# Patient Record
Sex: FEMALE | Race: AMERICAN INDIAN OR ALASKA NATIVE
[De-identification: names, ages, dates, MRNs, and addresses within clinical notes are randomized per-mention and may not be internally consistent; named-entity substitution may affect disease eponyms.]

---

## 2019-01-30 ENCOUNTER — HOSPITAL ENCOUNTER (EMERGENCY)
Dept: HOSPITAL 5 - ED | Age: 34
Discharge: HOME | End: 2019-01-30
Payer: COMMERCIAL

## 2019-01-30 PROCEDURE — 99282 EMERGENCY DEPT VISIT SF MDM: CPT

## 2019-01-30 NOTE — EMERGENCY DEPARTMENT REPORT
ED Upper Extremity Inj HPI





- General


Chief Complaint: Extremity Injury, Upper


Stated Complaint: LFT HAND AND WRIST PAIN


Time Seen by Provider: 01/30/19 10:23


Source: patient


Mode of arrival: Ambulatory


Limitations: No Limitations





- History of Present Illness


Initial Comments: 





This is a 33-year-old female nontoxic, well nourished in appearance, no acute 

signs of distress presents to the ED with c/o of bilateral wrist pain x months. 

Patient stated that she follows orthopedic doctor and had xrays within normal 

limits.  Patient stated she was diagnosed with inflamation and was prescribed 

steroids and tylneol #3 with minimal to no relief.  Patient stated has a follow-

up appointment with orthopedic doctor this friday.  Patient denies any trauma. 

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  Patient denies any 

numbness, tingling, fever, chills, nausea, vomiting, chest pain, shortness of 

breath, headache, stiff neck.  Patient denies any joint swelling or joint 

redness.  Patient stated has some decreased range of motion due to pain.  

Patient stated allergies to Ibuprofen.


MD Complaint: Injury to:: left, right, wrist


-: month(s)


Other Extremity Injury: Wrist: Left, Right


Other Injuries: none


Severity scale (0 -10): 8


Improves With: immobilization


Worsens With: movement of extremity


Associated Symptoms: denies other symptoms.  denies: weakness, numbness, neck 

pain, suspects foreign body, nausea/vomiting, heard/felt popping sensat





- Related Data


                                  Previous Rx's











 Medication  Instructions  Recorded  Last Taken  Type


 


Acetaminophen/Codeine [Tylenol 1 tab PO Q6H PRN #12 tab 12/26/18 Unknown Rx





/Codeine # 3 tab]    


 


HYDROcodone/ACETAMINOPHEN [Norco 1 each PO Q6H PRN #12 tablet 01/30/19 Unknown 

Rx





5-325 Tablet]    











                                    Allergies











Allergy/AdvReac Type Severity Reaction Status Date / Time


 


ibuprofen AdvReac  Unknown Verified 01/30/19 09:57














ED Review of Systems


ROS: 


Stated complaint: LFT HAND AND WRIST PAIN


Other details as noted in HPI





Constitutional: denies: chills, fever


Eyes: denies: eye pain, eye discharge, vision change


ENT: denies: ear pain, throat pain


Respiratory: denies: cough, shortness of breath, wheezing


Cardiovascular: denies: chest pain, palpitations


Endocrine: no symptoms reported


Gastrointestinal: denies: abdominal pain, nausea, diarrhea


Genitourinary: denies: urgency, dysuria, discharge


Musculoskeletal: arthralgia.  denies: back pain, joint swelling


Skin: denies: rash, lesions


Neurological: denies: headache, weakness, paresthesias


Psychiatric: denies: anxiety, depression


Hematological/Lymphatic: denies: easy bleeding, easy bruising





ED Past Medical Hx





- Past Medical History


Previous Medical History?: No





- Surgical History


Past Surgical History?: No





- Social History


Smoking Status: Never Smoker


Substance Use Type: None





- Medications


Home Medications: 


                                Home Medications











 Medication  Instructions  Recorded  Confirmed  Last Taken  Type


 


Acetaminophen/Codeine [Tylenol 1 tab PO Q6H PRN #12 tab 12/26/18  Unknown Rx





/Codeine # 3 tab]     


 


HYDROcodone/ACETAMINOPHEN [Norco 1 each PO Q6H PRN #12 tablet 01/30/19  Unknown 

Rx





5-325 Tablet]     














ED Physical Exam





- General


Limitations: No Limitations


General appearance: alert, in no apparent distress





- Head


Head exam: Present: atraumatic, normocephalic





- Extremities Exam


Extremities exam: Present: normal inspection, full ROM, tenderness, normal 

capillary refill.  Absent: joint swelling





- Expanded Upper Extremity Exam


  ** Left


Forearm Wrist exam: Present: normal inspection, full ROM.  Absent: tenderness, 

swelling, abrasion, laceration, ecchymosis, deformity, crepidus, dislocation, 

erythema, tenderness over anatomical snuff box, pain with axial thumb loading


Hand Wrist exam: Present: normal inspection, full ROM, tenderness.  Absent: sw

elling, abrasion, laceration, ecchymosis, deformity, crepidus, dislocation, 

erythema, amputation, nail avulsion, subungual hematoma


Hand L/R Back: 


                            __________________________














                            __________________________





 1 - pain here





 2 - pain here





Vascular: Present: vascular compromise, normal capillary refill





- Back Exam


Back exam: Present: normal inspection, full ROM





- Neurological Exam


Neurological exam: Present: alert, oriented X3





- Psychiatric


Psychiatric exam: Present: normal affect, normal mood





- Skin


Skin exam: Present: warm, dry, intact, normal color.  Absent: rash





- Other


Other exam information: 





Negative phalens test.





ED Course


                                   Vital Signs











  01/30/19





  09:57


 


Temperature 98.3 F


 


Pulse Rate 60


 


Respiratory 16





Rate 


 


Blood Pressure 131/86


 


O2 Sat by Pulse 99





Oximetry 














- Reevaluation(s)


Reevaluation #1: 





01/30/19 10:37


Patient is speaking in full sentences with no signs of distress noted.





ED Medical Decision Making





- Medical Decision Making





This is a 33-year-old female that presents with bilateral wrist arthralgia.  

Patient is stable and was examined by me.  As per patient x-rays has been 

obtained and she does have a orthopedic follow-up and has been seen by 

orthopedic physician with last visit last week.  Patient does have normal gait 

with no tenderness and no joint swelling.  No ecchymosis. no joint redness or 

swelling. Not warm to touch. No signs of cellulites present. Patient does have a

wrist immobilizer for pain comfort.  Patient was instructed to RICE therapy.  I 

will discharge patient with [place] for pain control.  At time of discharge, the 

patient does not seem toxic or ill in appearance.  No acute signs of distress 

noted.  Patient agrees to discharge treatment plan of care.  No further 

questions noted by the patient.


Critical care attestation.: 


If time is entered above; I have spent that time in minutes in the direct care 

of this critically ill patient, excluding procedure time.








ED Disposition


Clinical Impression: 


Arthralgia


Qualifiers:


 Joint pain location: wrist Laterality: bilateral Qualified Code(s): M25.531 - 

Pain in right wrist; M25.532 - Pain in left wrist





Disposition: DC-01 TO HOME OR SELFCARE


Is pt being admited?: No


Does the pt Need Aspirin: No


Condition: Stable


Additional Instructions: 


Follow-up with a orthopedic doctor in 3-5 days or if symptoms worsen and 

continue return to emergency room as soon as possible. 


Do not operate any machinery while taking Norco as this may cause drowsiness.


Prescriptions: 


HYDROcodone/ACETAMINOPHEN [Norco 5-325 Tablet] 1 each PO Q6H PRN #12 tablet


 PRN Reason: Pain , Severe (7-10)


Referrals: 


DOROTHY COSTA MD [Primary Care Provider] - 3-5 Days


PRIMARY CARE,MD [Referring] - 3-5 Days


ISIDRO MIN MD [Staff Physician] - 3-5 Days


Forms:  Work/School Release Form(ED)

## 2019-09-11 ENCOUNTER — HOSPITAL ENCOUNTER (EMERGENCY)
Dept: HOSPITAL 5 - ED | Age: 34
Discharge: HOME | End: 2019-09-11
Payer: SELF-PAY

## 2019-09-11 VITALS — SYSTOLIC BLOOD PRESSURE: 136 MMHG | DIASTOLIC BLOOD PRESSURE: 77 MMHG

## 2019-09-11 DIAGNOSIS — Z88.6: ICD-10-CM

## 2019-09-11 DIAGNOSIS — M17.11: Primary | ICD-10-CM

## 2019-09-11 DIAGNOSIS — Z79.899: ICD-10-CM

## 2019-09-11 DIAGNOSIS — D16.21: ICD-10-CM

## 2019-09-11 PROCEDURE — 73562 X-RAY EXAM OF KNEE 3: CPT

## 2019-09-11 PROCEDURE — 29505 APPLICATION LONG LEG SPLINT: CPT

## 2019-09-11 PROCEDURE — 99283 EMERGENCY DEPT VISIT LOW MDM: CPT

## 2019-09-11 NOTE — EMERGENCY DEPARTMENT REPORT
Blank Doc





- Documentation


Documentation: 





34-year-old female that presents with right knee pain.





This initial assessment/diagnostic orders/clinical plan/treatment(s) is/are 

subject to change based on patient's health status, clinical progression and re-

assessment by fellow clinical providers in the ED.  Further treatment and workup

at subsequent clinical providers discretion.  Patient/guardians urged not to 

elope from the ED as their condition may be serious if not clinically assessed 

and managed.  Initial orders include:


1- Patient sent to [facility] for further evaluation and treatment


2- xrays

## 2019-09-11 NOTE — EMERGENCY DEPARTMENT REPORT
ED Extremity Problem HPI





- General


Chief complaint: Extremity Injury, Lower


Stated complaint: PAIN IN (R) KNEE


Time Seen by Provider: 19 17:50


Source: patient


Mode of arrival: Ambulatory


Limitations: No Limitations





- History of Present Illness


Initial comments: 





Patient is a 34-year-old -American female with a history of chronic right

knee pain who presents to the ED with complaint of acute exacerbation of her 

chronic right knee pain for the last 1 week.  Patient denies fall, traumatic 

injury, dizziness, fever, chills, cough, nausea and vomiting, chest pain or 

shortness of breath.  Patient states that she used to be physically active in 

basketball many years ago and injured the right knee while playing basketball.  

Patient states that the current pain is worse with inability range of motion 

when she wakes up in the morning.


MD Complaint: extremity pain (right knee), joint paint (right knee)


-: Gradual, year(s) (many)


Location: right, lower extremity (knee), knee


History of Same: Yes (chronic)


-: Yes arthralgia, No fever, No associated dyspnea, No associated chest pain


Severity scale (0 -10): 8


Quality: aching, sharp


Consistency: constant


Improves with: movement


Worsens with: palpation, rest


Associated Symptoms: denies other symptoms, arthralgias.  denies: chest pain, 

shortness of breath, myalgias, rash





- Related Data


                                  Previous Rx's











 Medication  Instructions  Recorded  Last Taken  Type


 


Acetaminophen/Codeine [Tylenol 1 tab PO Q6H PRN #12 tab 18 Unknown Rx





/Codeine # 3 tab]    


 


HYDROcodone/ACETAMINOPHEN [Norco 1 each PO Q6H PRN #12 tablet 19 Unknown 

Rx





5-325 Tablet]    


 


predniSONE [Deltasone] 40 mg PO QDAY #12 tab 19 Unknown Rx


 


traMADol [Ultram] 50 mg PO Q6HR PRN #15 tablet 19 Unknown Rx











                                    Allergies











Allergy/AdvReac Type Severity Reaction Status Date / Time


 


ibuprofen AdvReac  Unknown Verified 19 09:57














ED Review of Systems


ROS: 


Stated complaint: PAIN IN (R) KNEE


Other details as noted in HPI





Constitutional: denies: chills, fever


Eyes: denies: eye pain, eye discharge, vision change


ENT: denies: ear pain, throat pain


Respiratory: denies: cough, shortness of breath, wheezing


Cardiovascular: denies: chest pain, palpitations


Endocrine: no symptoms reported


Gastrointestinal: denies: abdominal pain, nausea, diarrhea


Genitourinary: denies: urgency, dysuria, discharge


Musculoskeletal: arthralgia (right knee), myalgia.  denies: back pain, joint 

swelling


Skin: denies: rash, lesions


Neurological: denies: headache, weakness, paresthesias


Psychiatric: denies: anxiety, depression


Hematological/Lymphatic: denies: easy bleeding, easy bruising





ED Past Medical Hx





- Past Medical History


Previous Medical History?: No





- Surgical History


Past Surgical History?: No





- Social History


Smoking Status: Never Smoker


Substance Use Type: Alcohol





- Medications


Home Medications: 


                                Home Medications











 Medication  Instructions  Recorded  Confirmed  Last Taken  Type


 


Acetaminophen/Codeine [Tylenol 1 tab PO Q6H PRN #12 tab 18  Unknown Rx





/Codeine # 3 tab]     


 


HYDROcodone/ACETAMINOPHEN [Norco 1 each PO Q6H PRN #12 tablet 19  Unknown 

Rx





5-325 Tablet]     


 


predniSONE [Deltasone] 40 mg PO QDAY #12 tab 19  Unknown Rx


 


traMADol [Ultram] 50 mg PO Q6HR PRN #15 tablet 19  Unknown Rx














ED Physical Exam





- General


Limitations: No Limitations


General appearance: alert, in no apparent distress





- Head


Head exam: Present: atraumatic, normocephalic, normal inspection





- Eye


Eye exam: Present: normal appearance, PERRL, EOMI





- ENT


ENT exam: Present: normal exam, normal orophraynx, mucous membranes moist, TM's 

normal bilaterally, normal external ear exam





- Neck


Neck exam: Present: normal inspection, full ROM





- Respiratory


Respiratory exam: Present: normal lung sounds bilaterally.  Absent: respiratory 

distress, wheezes, rales, chest wall tenderness, accessory muscle use, decreased

 breath sounds





- Cardiovascular


Cardiovascular Exam: Present: regular rate, normal rhythm, normal heart sounds. 

 Absent: systolic murmur, diastolic murmur, rubs, gallop





- GI/Abdominal


GI/Abdominal exam: Present: soft, normal bowel sounds.  Absent: tenderness, 

guarding, rebound, hyperactive bowel sounds, hypoactive bowel sounds





- Extremities Exam


Extremities exam: Present: normal inspection, tenderness (Palpable right knee 

tenderness with limited ROM due to pain), normal capillary refill.  Absent: 

pedal edema, joint swelling, calf tenderness





- Back Exam


Back exam: Present: normal inspection, full ROM.  Absent: tenderness, muscle 

spasm, paraspinal tenderness





- Neurological Exam


Neurological exam: Present: alert, oriented X3, CN II-XII intact, normal gait, 

reflexes normal





- Psychiatric


Psychiatric exam: Present: normal affect, normal mood





- Skin


Skin exam: Present: warm, dry, intact, normal color.  Absent: rash





ED Course


                                   Vital Signs











  19





  17:50


 


Temperature 98.2 F


 


Pulse Rate 62


 


Respiratory 13





Rate 


 


Blood Pressure 153/86





[Left] 


 


O2 Sat by Pulse 98





Oximetry 














- Reevaluation(s)


Reevaluation #1: 





19 21:52


This is a 34-year-old female who presented to the ED with acute exacerbation of 

her chronic right knee pain for the last 1 week.  In the ED, patient is alert 

and oriented 3 and appears to be in pain but is not in distress.  Right knee x-

ray shows a sessile type osteochondroma identified with involving the posterior 

aspect of the distal femoral metaphysis. I cannot exclude possible mass effect 

on the underlying neurovascular bundle. No fracture or subluxation.  Patient was

 tested for pain in the ED and the right knee was splinted and Ace wrap.  

Patient was discharged home on pain medications and advised to follow-up with 

her primary care physician in 5-7 days for reevaluation.  Patient was also given

 a referral to the orthopedic surgeon Dr. Luna for further evaluation.  

Patient is advised to return to the ED immediately if symptoms get worse.





ED Medical Decision Making





- Radiology Data


Radiology results: report reviewed, image reviewed





Findings


[address] [address]





XRay Report


Signed





Patient: KEY ABBOTT MR#


: [id number]


: 1985 Acct:[id number]





Age/Sex: 34 / F ADM Date: 19





Loc: ED


Attending Dr:








Ordering Physician: LIZZY NEVES NP


Date of Service: 19


Procedure(s): XR knee 3V RT


Accession Number(s): [id number]





cc: LIZZY NEVES NP





Fluoro Time In Minutes:





Right knee 3 views





INDICATION: Right knee pain.





IMPRESSION: Sessile type osteochondroma is identified with involving the poste

rior aspect of the


distal femoral metaphysis. I cannot exclude possible mass effect on the 

underlying neurovascular


bundle. No fracture or subluxation.





Signer Name: Dedrick Paez MD


Signed: 2019 6:22 PM


Workstation Name: [id number]








Transcribed By: BC


Dictated By: Dedrick Paez MD


Electronically Authenticated By: Dedrick Paez MD


Signed Date/Time: 19 1822





- Medical Decision Making





This is a 34-year-old female who presented to the ED with acute exacerbation of 

her chronic right knee pain for the last 1 week.  In the ED, patient is alert 

and oriented 3 and appears to be in pain but is not in distress.  Right knee x-

ray shows a sessile type osteochondroma identified with involving the posterior 

aspect of the distal femoral metaphysis. I cannot exclude possible mass effect 

on the underlying neurovascular bundle. No fracture or subluxation.  Patient was

 tested for pain in the ED and the right knee was splinted and Ace wrap.  

Patient was discharged home on pain medications and advised to follow-up with 

her primary care physician in 5-7 days for reevaluation.  Patient was also given

 a referral to the orthopedic surgeon Dr. Luna for further evaluation.  

Patient is advised to return to the ED immediately if symptoms get worse.





- Differential Diagnosis


chronic right knee pain; osteoarthritis; muscle strain, osteochondroma


Critical care attestation.: 


If time is entered above; I have spent that time in minutes in the direct care 

of this critically ill patient, excluding procedure time.








ED Disposition


Clinical Impression: 


 Chronic osteoarthritis, Chronic pain of right knee, Osteochondroma of right 

femur





Disposition: DC-01 TO HOME OR SELFCARE


Is pt being admited?: No


Does the pt Need Aspirin: No


Condition: Stable


Instructions:  Osteoarthritis (ED), Knee Pain (ED), Arthralgia (ED)


Additional Instructions: 


Take medications with food, drink plenty of fluids and follow-up with the 

primary care physician in 5-7 days for reevaluation.  Follow-up also with the 

orthopedic surgeon Dr. Luna for further evaluation.  Return to the ED 

immediately if symptoms get worse.


Prescriptions: 


predniSONE [Deltasone] 40 mg PO QDAY #12 tab


traMADol [Ultram] 50 mg PO Q6HR PRN #15 tablet


 PRN Reason: Pain


Referrals: 


PRIMARY MD BRODY [Primary Care Provider] - 3-5 Days


ISIDRO LUNA MD [Staff Physician] - 3-5 Days


Time of Disposition: 21:41


Print Language: ENGLISH

## 2019-09-11 NOTE — XRAY REPORT
Right knee 3 views



INDICATION: Right knee pain.



IMPRESSION: Sessile type osteochondroma is identified with involving the posterior aspect of the dist
al femoral metaphysis. I cannot exclude possible mass effect on the underlying neurovascular bundle. 
No fracture or subluxation.



Signer Name: Dedrick Paez MD 

Signed: 9/11/2019 6:22 PM

 Workstation Name: [id number]

## 2020-09-26 ENCOUNTER — HOSPITAL ENCOUNTER (EMERGENCY)
Dept: HOSPITAL 5 - ED | Age: 35
LOS: 1 days | Discharge: HOME | End: 2020-09-27
Payer: SELF-PAY

## 2020-09-26 DIAGNOSIS — Z79.899: ICD-10-CM

## 2020-09-26 DIAGNOSIS — K52.89: Primary | ICD-10-CM

## 2020-09-26 DIAGNOSIS — Z88.6: ICD-10-CM

## 2020-09-26 DIAGNOSIS — N39.0: ICD-10-CM

## 2020-09-26 LAB
ALBUMIN SERPL-MCNC: 4.6 G/DL (ref 3.9–5)
ALT SERPL-CCNC: 16 UNITS/L (ref 7–56)
BAND NEUTROPHILS # (MANUAL): 0 K/MM3
BILIRUB UR QL STRIP: (no result)
BLOOD UR QL VISUAL: (no result)
BUN SERPL-MCNC: 9 MG/DL (ref 7–17)
BUN/CREAT SERPL: 15 %
CALCIUM SERPL-MCNC: 9.6 MG/DL (ref 8.4–10.2)
HCT VFR BLD CALC: 39.6 % (ref 30.3–42.9)
HEMOLYSIS INDEX: 31
HGB BLD-MCNC: 13.2 GM/DL (ref 10.1–14.3)
MCHC RBC AUTO-ENTMCNC: 33 % (ref 30–34)
MCV RBC AUTO: 101 FL (ref 79–97)
MUCOUS THREADS #/AREA URNS HPF: (no result) /HPF
MYELOCYTES # (MANUAL): 0 K/MM3
PH UR STRIP: 5 [PH] (ref 5–7)
PLATELET # BLD: 317 K/MM3 (ref 140–440)
PROMYELOCYTES # (MANUAL): 0 K/MM3
RBC # BLD AUTO: 3.91 M/MM3 (ref 3.65–5.03)
RBC #/AREA URNS HPF: 174 /HPF (ref 0–6)
TOTAL CELLS COUNTED BLD: 100
UROBILINOGEN UR-MCNC: < 2 MG/DL (ref ?–2)
WBC #/AREA URNS HPF: 66 /HPF (ref 0–6)

## 2020-09-26 PROCEDURE — 83690 ASSAY OF LIPASE: CPT

## 2020-09-26 PROCEDURE — 96365 THER/PROPH/DIAG IV INF INIT: CPT

## 2020-09-26 PROCEDURE — 87086 URINE CULTURE/COLONY COUNT: CPT

## 2020-09-26 PROCEDURE — 99284 EMERGENCY DEPT VISIT MOD MDM: CPT

## 2020-09-26 PROCEDURE — 74177 CT ABD & PELVIS W/CONTRAST: CPT

## 2020-09-26 PROCEDURE — 84703 CHORIONIC GONADOTROPIN ASSAY: CPT

## 2020-09-26 PROCEDURE — 96375 TX/PRO/DX INJ NEW DRUG ADDON: CPT

## 2020-09-26 PROCEDURE — 82140 ASSAY OF AMMONIA: CPT

## 2020-09-26 PROCEDURE — 96361 HYDRATE IV INFUSION ADD-ON: CPT

## 2020-09-26 PROCEDURE — 36415 COLL VENOUS BLD VENIPUNCTURE: CPT

## 2020-09-26 PROCEDURE — 81001 URINALYSIS AUTO W/SCOPE: CPT

## 2020-09-26 PROCEDURE — 87040 BLOOD CULTURE FOR BACTERIA: CPT

## 2020-09-26 PROCEDURE — 80053 COMPREHEN METABOLIC PANEL: CPT

## 2020-09-26 PROCEDURE — 85025 COMPLETE CBC W/AUTO DIFF WBC: CPT

## 2020-09-26 PROCEDURE — 85007 BL SMEAR W/DIFF WBC COUNT: CPT

## 2020-09-26 NOTE — EMERGENCY DEPARTMENT REPORT
ED Abdominal Pain HPI





- General


Chief Complaint: Abdominal Pain


Stated Complaint: VOMITING/ABDOMINAL PAIN


Time Seen by Provider: 09/26/20 22:40


Source: patient


Limitations: No Limitations





- History of Present Illness


Initial Comments: 





35-year-old -American female patient without significant past medical 

history presents with complaints of sudden onset of nausea/vomiting/diarrhea and

generalized abdominal pain starting around 4 AM today.  She rates her current 

pain as a 10/10 in severity and describes it as aching and stabbing.  She denies

any previous abdominal surgeries.  Patient also denies any hematic 

emesis/coffee-ground emesis, hematochezia/melena, recent antibiotic use, fevers,

chest pain, shortness of breath, dysuria/hematuria/urinary frequency, or vaginal

discharge/dyspareunia.  She reports she has had more than 10 episodes of 

vomiting.  She denies heavy alcohol intake.


-: Sudden


Location: diffuse





- Related Data


                                  Previous Rx's











 Medication  Instructions  Recorded  Last Taken  Type


 


Acetaminophen/Codeine [Tylenol 1 tab PO Q6H PRN #12 tab 12/26/18 Unknown Rx





/Codeine # 3 tab]    


 


HYDROcodone/ACETAMINOPHEN [Norco 1 each PO Q6H PRN #12 tablet 01/30/19 Unknown 

Rx





5-325 Tablet]    


 


predniSONE [Deltasone] 40 mg PO QDAY #12 tab 09/11/19 Unknown Rx


 


traMADoL [Ultram] 50 mg PO Q6HR PRN #15 tablet 09/11/19 Unknown Rx


 


Acetaminophen/Codeine [Tylenol 1 tab PO Q6H PRN #8 tab 09/27/20 Unknown Rx





/Codeine # 3 tab]    


 


Metoclopramide [Reglan] 10 mg PO TID PRN #15 tab 09/27/20 Unknown Rx


 


diphenhydrAMINE [Benadryl CAP] 25 mg PO QHS PRN #15 capsule 09/27/20 Unknown Rx


 


levoFLOXacin [Levaquin TAB] 500 mg PO QDAY 4 Days #4 tablet 09/27/20 Unknown Rx











                                    Allergies











Allergy/AdvReac Type Severity Reaction Status Date / Time


 


ibuprofen AdvReac  Unknown Verified 01/30/19 09:57














ED Review of Systems


ROS: 


Stated complaint: VOMITING/ABDOMINAL PAIN


Other details as noted in HPI





Constitutional: chills, malaise.  denies: diaphoresis, fever, weakness


ENT: denies: throat pain


Respiratory: denies: cough, shortness of breath


Cardiovascular: denies: chest pain


Endocrine: denies: excessive sweating


Gastrointestinal: abdominal pain, nausea, vomiting, diarrhea.  denies: 

constipation, hematemesis, melena, hematochezia


Genitourinary: denies: urgency, dysuria, frequency, hematuria


Musculoskeletal: denies: back pain


Skin: denies: rash, lesions





ED Past Medical Hx





- Past Medical History


Previous Medical History?: No





- Surgical History


Past Surgical History?: No





- Social History


Smoking Status: Never Smoker


Substance Use Type: None





- Medications


Home Medications: 


                                Home Medications











 Medication  Instructions  Recorded  Confirmed  Last Taken  Type


 


Acetaminophen/Codeine [Tylenol 1 tab PO Q6H PRN #12 tab 12/26/18  Unknown Rx





/Codeine # 3 tab]     


 


HYDROcodone/ACETAMINOPHEN [Norco 1 each PO Q6H PRN #12 tablet 01/30/19  Unknown 

Rx





5-325 Tablet]     


 


predniSONE [Deltasone] 40 mg PO QDAY #12 tab 09/11/19  Unknown Rx


 


traMADoL [Ultram] 50 mg PO Q6HR PRN #15 tablet 09/11/19  Unknown Rx


 


Acetaminophen/Codeine [Tylenol 1 tab PO Q6H PRN #8 tab 09/27/20  Unknown Rx





/Codeine # 3 tab]     


 


Metoclopramide [Reglan] 10 mg PO TID PRN #15 tab 09/27/20  Unknown Rx


 


diphenhydrAMINE [Benadryl CAP] 25 mg PO QHS PRN #15 capsule 09/27/20  Unknown Rx


 


levoFLOXacin [Levaquin TAB] 500 mg PO QDAY 4 Days #4 tablet 09/27/20  Unknown Rx














ED Physical Exam





- General


Limitations: No Limitations


General appearance: alert, in no apparent distress





- Head


Head exam: Present: atraumatic, normocephalic





- Eye


Eye exam: Present: normal appearance.  Absent: scleral icterus





- ENT


ENT exam: Present: mucous membranes moist





- Neck


Neck exam: Present: normal inspection





- Respiratory


Respiratory exam: Present: normal lung sounds bilaterally.  Absent: respiratory 

distress





- Cardiovascular


Cardiovascular Exam: Present: regular rate, normal rhythm





- GI/Abdominal


GI/Abdominal exam: Present: soft, tenderness (Generalized, worse in 

periumbilical and epigastric region), rebound, normal bowel sounds.  Absent: 

distended, rigid





- Extremities Exam


Extremities exam: Present: normal inspection





- Back Exam


Back exam: Present: normal inspection, full ROM.  Absent: CVA tenderness (R), 

CVA tenderness (L)





- Neurological Exam


Neurological exam: Present: alert, oriented X3, normal gait





- Psychiatric


Psychiatric exam: Present: normal affect, normal mood





- Skin


Skin exam: Present: warm, dry, intact, normal color.  Absent: rash, cyanosis, 

diaphoretic, ecchymosis





ED Course


                                   Vital Signs











  09/26/20 09/27/20





  21:12 01:29


 


Temperature 98.5 F 98.2 F


 


Pulse Rate 77 72


 


Respiratory 14 18





Rate  


 


Blood Pressure 119/90 


 


Blood Pressure  117/71





[Left]  


 


O2 Sat by Pulse 98 98





Oximetry  














ED Medical Decision Making





- Lab Data


Result diagrams: 


                                 09/26/20 21:23





                                 09/26/20 21:23








                                   Lab Results











  09/26/20 09/26/20 09/26/20 Range/Units





  21:13 21:23 21:23 


 


WBC   20.1 H   (4.5-11.0)  K/mm3


 


RBC   3.91   (3.65-5.03)  M/mm3


 


Hgb   13.2   (10.1-14.3)  gm/dl


 


Hct   39.6   (30.3-42.9)  %


 


MCV   101 H   (79-97)  fl


 


MCH   34 H   (28-32)  pg


 


MCHC   33   (30-34)  %


 


RDW   13.0 L   (13.2-15.2)  %


 


Plt Count   317   (140-440)  K/mm3


 


Add Manual Diff   Complete   


 


Total Counted   100   


 


Seg Neutrophils %   Np   


 


Seg Neuts % (Manual)   94.0 H   (40.0-70.0)  %


 


Band Neutrophils %   0   %


 


Lymphocytes % (Manual)   4.0 L   (13.4-35.0)  %


 


Reactive Lymphs % (Man)   0   %


 


Monocytes % (Manual)   2.0   (0.0-7.3)  %


 


Eosinophils % (Manual)   0   (0.0-4.3)  %


 


Basophils % (Manual)   0   (0.0-1.8)  %


 


Metamyelocytes %   0   %


 


Myelocytes %   0   %


 


Promyelocytes %   0   %


 


Blast Cells %   0   %


 


Nucleated RBC %   Not Reportable   


 


Seg Neutrophils # Man   18.9 H   (1.8-7.7)  K/mm3


 


Band Neutrophils #   0.0   K/mm3


 


Lymphocytes # (Manual)   0.8 L   (1.2-5.4)  K/mm3


 


Abs React Lymphs (Man)   0.0   K/mm3


 


Monocytes # (Manual)   0.4   (0.0-0.8)  K/mm3


 


Eosinophils # (Manual)   0.0   (0.0-0.4)  K/mm3


 


Basophils # (Manual)   0.0   (0.0-0.1)  K/mm3


 


Metamyelocytes #   0.0   K/mm3


 


Myelocytes #   0.0   K/mm3


 


Promyelocytes #   0.0   K/mm3


 


Blast Cells #   0.0   K/mm3


 


WBC Morphology   Not Reportable   


 


Hypersegmented Neuts   Not Reportable   


 


Hyposegmented Neuts   Not Reportable   


 


Hypogranular Neuts   Not Reportable   


 


Smudge Cells   Not Reportable   


 


Toxic Granulation   Not Reportable   


 


Toxic Vacuolation   Not Reportable   


 


Dohle Bodies   Not Reportable   


 


Pelger-Huet Anomaly   Not Reportable   


 


Celio Rods   Not Reportable   


 


Platelet Estimate   Consistent w auto   


 


Clumped Platelets   Not Reportable   


 


Plt Clumps, EDTA   Not Reportable   


 


Large Platelets   Not Reportable   


 


Giant Platelets   Not Reportable   


 


Platelet Satelliting   Not Reportable   


 


Plt Morphology Comment   Not Reportable   


 


RBC Morphology   Normal   


 


Dimorphic RBCs   Not Reportable   


 


Polychromasia   Not Reportable   


 


Hypochromasia   Not Reportable   


 


Poikilocytosis   Not Reportable   


 


Anisocytosis   Not Reportable   


 


Microcytosis   Not Reportable   


 


Macrocytosis   Not Reportable   


 


Spherocytes   Not Reportable   


 


Pappenheimer Bodies   Not Reportable   


 


Sickle Cells   Not Reportable   


 


Target Cells   Not Reportable   


 


Tear Drop Cells   Not Reportable   


 


Ovalocytes   Not Reportable   


 


Helmet Cells   Not Reportable   


 


[place] Bodies   Not Reportable   


 


Cabot Rings   Not Reportable   


 


[place] Cells   Not Reportable   


 


Bite Cells   Not Reportable   


 


Crenated Cell   Not Reportable   


 


Elliptocytes   Not Reportable   


 


Acanthocytes (Spur)   Not Reportable   


 


Rouleaux   Not Reportable   


 


Hemoglobin C Crystals   Not Reportable   


 


Schistocytes   Not Reportable   


 


Malaria parasites   Not Reportable   


 


Sea Bodies   Not Reportable   


 


Hem Pathologist Commnt   No   


 


Sodium    135 L  (137-145)  mmol/L


 


Potassium    4.0  (3.6-5.0)  mmol/L


 


Chloride    97.2 L  ()  mmol/L


 


Carbon Dioxide    21 L  (22-30)  mmol/L


 


Anion Gap    21  mmol/L


 


BUN    9  (7-17)  mg/dL


 


Creatinine    0.6  (0.6-1.2)  mg/dL


 


Estimated GFR    > 60  ml/min


 


BUN/Creatinine Ratio    15  %


 


Glucose    116 H  ()  mg/dL


 


Lactic Acid     (0.7-2.0)  mmol/L


 


Calcium    9.6  (8.4-10.2)  mg/dL


 


Total Bilirubin    0.90  (0.1-1.2)  mg/dL


 


AST    18  (5-40)  units/L


 


ALT    16  (7-56)  units/L


 


Alkaline Phosphatase    76  ()  units/L


 


Total Protein    7.9  (6.3-8.2)  g/dL


 


Albumin    4.6  (3.9-5)  g/dL


 


Albumin/Globulin Ratio    1.4  %


 


Lipase    13  (13-60)  units/L


 


HCG, Qual     (Negative)  


 


Urine Color  Yellow    (Yellow)  


 


Urine Turbidity  Slightly-cloudy    (Clear)  


 


Urine pH  5.0    (5.0-7.0)  


 


Ur Specific Gravity  1.024    (1.003-1.030)  


 


Urine Protein  30 mg/dl    (Negative)  mg/dL


 


Urine Glucose (UA)  Neg    (Negative)  mg/dL


 


Urine Ketones  Neg    (Negative)  mg/dL


 


Urine Blood  Lg    (Negative)  


 


Urine Nitrite  Neg    (Negative)  


 


Urine Bilirubin  Neg    (Negative)  


 


Urine Urobilinogen  < 2.0    (<2.0)  mg/dL


 


Ur Leukocyte Esterase  Lg    (Negative)  


 


Urine WBC (Auto)  66.0 H    (0.0-6.0)  /HPF


 


Urine RBC (Auto)  174.0    (0.0-6.0)  /HPF


 


U Epithel Cells (Auto)  7.0    (0-13.0)  /HPF


 


Urine Mucus  3+    /HPF














  09/26/20 09/27/20 Range/Units





  21:23 00:18 


 


WBC    (4.5-11.0)  K/mm3


 


RBC    (3.65-5.03)  M/mm3


 


Hgb    (10.1-14.3)  gm/dl


 


Hct    (30.3-42.9)  %


 


MCV    (79-97)  fl


 


MCH    (28-32)  pg


 


MCHC    (30-34)  %


 


RDW    (13.2-15.2)  %


 


Plt Count    (140-440)  K/mm3


 


Add Manual Diff    


 


Total Counted    


 


Seg Neutrophils %    


 


Seg Neuts % (Manual)    (40.0-70.0)  %


 


Band Neutrophils %    %


 


Lymphocytes % (Manual)    (13.4-35.0)  %


 


Reactive Lymphs % (Man)    %


 


Monocytes % (Manual)    (0.0-7.3)  %


 


Eosinophils % (Manual)    (0.0-4.3)  %


 


Basophils % (Manual)    (0.0-1.8)  %


 


Metamyelocytes %    %


 


Myelocytes %    %


 


Promyelocytes %    %


 


Blast Cells %    %


 


Nucleated RBC %    


 


Seg Neutrophils # Man    (1.8-7.7)  K/mm3


 


Band Neutrophils #    K/mm3


 


Lymphocytes # (Manual)    (1.2-5.4)  K/mm3


 


Abs React Lymphs (Man)    K/mm3


 


Monocytes # (Manual)    (0.0-0.8)  K/mm3


 


Eosinophils # (Manual)    (0.0-0.4)  K/mm3


 


Basophils # (Manual)    (0.0-0.1)  K/mm3


 


Metamyelocytes #    K/mm3


 


Myelocytes #    K/mm3


 


Promyelocytes #    K/mm3


 


Blast Cells #    K/mm3


 


WBC Morphology    


 


Hypersegmented Neuts    


 


Hyposegmented Neuts    


 


Hypogranular Neuts    


 


Smudge Cells    


 


Toxic Granulation    


 


Toxic Vacuolation    


 


Dohle Bodies    


 


Pelger-Huet Anomaly    


 


Celio Rods    


 


Platelet Estimate    


 


Clumped Platelets    


 


Plt Clumps, EDTA    


 


Large Platelets    


 


Giant Platelets    


 


Platelet Satelliting    


 


Plt Morphology Comment    


 


RBC Morphology    


 


Dimorphic RBCs    


 


Polychromasia    


 


Hypochromasia    


 


Poikilocytosis    


 


Anisocytosis    


 


Microcytosis    


 


Macrocytosis    


 


Spherocytes    


 


Pappenheimer Bodies    


 


Sickle Cells    


 


Target Cells    


 


Tear Drop Cells    


 


Ovalocytes    


 


Helmet Cells    


 


[place] Bodies    


 


Cabot Rings    


 


[place] Cells    


 


Bite Cells    


 


Crenated Cell    


 


Elliptocytes    


 


Acanthocytes (Spur)    


 


Rouleaux    


 


Hemoglobin C Crystals    


 


Schistocytes    


 


Malaria parasites    


 


Sea Bodies    


 


Hem Pathologist Commnt    


 


Sodium    (137-145)  mmol/L


 


Potassium    (3.6-5.0)  mmol/L


 


Chloride    ()  mmol/L


 


Carbon Dioxide    (22-30)  mmol/L


 


Anion Gap    mmol/L


 


BUN    (7-17)  mg/dL


 


Creatinine    (0.6-1.2)  mg/dL


 


Estimated GFR    ml/min


 


BUN/Creatinine Ratio    %


 


Glucose    ()  mg/dL


 


Lactic Acid   1.50  (0.7-2.0)  mmol/L


 


Calcium    (8.4-10.2)  mg/dL


 


Total Bilirubin    (0.1-1.2)  mg/dL


 


AST    (5-40)  units/L


 


ALT    (7-56)  units/L


 


Alkaline Phosphatase    ()  units/L


 


Total Protein    (6.3-8.2)  g/dL


 


Albumin    (3.9-5)  g/dL


 


Albumin/Globulin Ratio    %


 


Lipase    (13-60)  units/L


 


HCG, Qual  Negative   (Negative)  


 


Urine Color    (Yellow)  


 


Urine Turbidity    (Clear)  


 


Urine pH    (5.0-7.0)  


 


Ur Specific Gravity    (1.003-1.030)  


 


Urine Protein    (Negative)  mg/dL


 


Urine Glucose (UA)    (Negative)  mg/dL


 


Urine Ketones    (Negative)  mg/dL


 


Urine Blood    (Negative)  


 


Urine Nitrite    (Negative)  


 


Urine Bilirubin    (Negative)  


 


Urine Urobilinogen    (<2.0)  mg/dL


 


Ur Leukocyte Esterase    (Negative)  


 


Urine WBC (Auto)    (0.0-6.0)  /HPF


 


Urine RBC (Auto)    (0.0-6.0)  /HPF


 


U Epithel Cells (Auto)    (0-13.0)  /HPF


 


Urine Mucus    /HPF














- Radiology Data


Radiology results: report reviewed








 CT ABDOMEN AND PELVIS WITH CONTRAST 





HISTORY: Generalized abdominal pain. 





COMPARISON: None 





TECHNIQUE: Routine abdominal and pelvic CT exam performed following intravenous 

contrast 


 administration. Patient received 100 cc of IV Omnipaque 300. All CT scans at 

this location are 


 performed using CT dose reduction for ALARA by means of automated exposure 

control. 





FINDINGS: 





CT ABDOMEN: 


Lung Bases: No significant abnormality. 


Liver: No significant abnormality. 


Biliary: No significant abnormality. 


Spleen: No significant abnormality. Unenlarged. 


Pancreas: No significant abnormality. 


Adrenals: No significant abnormality. 


Kidneys: No significant abnormality. 


Lymphatics: No lymphadenopathy. 


Vasculature: No significant abnormality. 


Bowel/Peritoneum: There is mild generalized hyperenhancement in the small bowel 

wall likely 


 reflecting enteritis. There is no obstruction, free air, pneumatosis, or portal

 venous gas. Normal 


 appendix. 





CT PELVIC: 


: No significant abnormality. 


Lymphatics: No lymphadenopathy. 





Osseous Structures: No aggressive appearing osseous lesions. 


Additional Findings: None 





IMPRESSION: 


1. Generalized mild hyperenhancement and wall thickening of the small bowel 

likely reflecting 


 enteritis, without obstruction or free air. 





- Medical Decision Making





35-year-old -American female patient without significant past medical h

istory presents with complaints of sudden onset of nausea/vomiting/diarrhea and 

generalized abdominal pain starting around 4 AM today.  She rates her current 

pain as a 10/10 in severity and describes it as aching and stabbing.  She denies

 any previous abdominal surgeries.  Patient also denies any hematic 

emesis/coffee-ground emesis, hematochezia/melena, recent antibiotic use, fevers,

 chest pain, shortness of breath, dysuria/hematuria/urinary frequency, or 

vaginal discharge/dyspareunia.  She reports she has had more than 10 episodes of

 vomiting.  She denies heavy alcohol intake.


Tenderness with rebound of the abdomen noted on exam.  White count is 20.  UA 

shows 66 WBCs.  Patient denies any urinary symptoms.  CT abdomen shows the 

following:Generalized mild hyperenhancement and wall thickening of the small 

bowel likely reflecting enteritis, without obstruction or free air.  She is 

afebrile and non-tachycardic.  Anion gap = 21, CMP is otherwise normal.  Lactic 

acid is 1.5.  Given white count and UTI with enteritis, IV Levaquin given.  She 

is tolerating fluids p.o.  Patient is stable for discharge home and follow-up 

with GI.  Prescription for Levaquin given for home.  Strict return precautions 

were discussed in detail with patient who verbalized understanding.


Critical care attestation.: 


If time is entered above; I have spent that time in minutes in the direct care 

of this critically ill patient, excluding procedure time.








ED Disposition


Clinical Impression: 


 Enteritis, Complicated UTI (urinary tract infection)





Disposition: DC-01 TO HOME OR SELFCARE


Is pt being admited?: No


Condition: Stable


Instructions:  Urinary Tract Infection in Women (ED), Gastroenteritis (ED)


Prescriptions: 


diphenhydrAMINE [Benadryl CAP] 25 mg PO QHS PRN #15 capsule


 PRN Reason: Nausea


levoFLOXacin [Levaquin TAB] 500 mg PO QDAY 4 Days #4 tablet


Metoclopramide [Reglan] 10 mg PO TID PRN #15 tab


 PRN Reason: Nausea


Acetaminophen/Codeine [Tylenol /Codeine # 3 tab] 1 tab PO Q6H PRN #8 tab


 PRN Reason: Pain , Severe (7-10)


Referrals: 


[facility] [Provider Group] - 09/28/20

## 2020-09-27 VITALS — SYSTOLIC BLOOD PRESSURE: 117 MMHG | DIASTOLIC BLOOD PRESSURE: 71 MMHG

## 2020-09-27 NOTE — CAT SCAN REPORT
CT ABDOMEN AND PELVIS WITH CONTRAST



HISTORY: Generalized abdominal pain.



COMPARISON: None



TECHNIQUE: Routine abdominal and pelvic CT exam performed  following intravenous contrast administrat
ion. Patient received 100 cc of IV Omnipaque 300. All CT scans at this location are performed using C
T dose reduction for ALARA by means of automated exposure control.



FINDINGS:



CT ABDOMEN:

Lung Bases: No significant abnormality.

Liver: No significant abnormality.

Biliary: No significant abnormality.

Spleen: No significant abnormality.  Unenlarged.

Pancreas: No significant abnormality.

Adrenals: No significant abnormality.

Kidneys: No significant abnormality.

Lymphatics: No lymphadenopathy.

Vasculature: No significant abnormality. 

Bowel/Peritoneum: There is mild generalized hyperenhancement in the small bowel wall likely reflectin
g enteritis. There is no obstruction, free air, pneumatosis, or portal venous gas. Normal appendix.



CT PELVIC:

: No significant abnormality.

Lymphatics: No lymphadenopathy.



Osseous Structures: No aggressive appearing osseous lesions.

Additional Findings: None



IMPRESSION:

1. Generalized mild hyperenhancement and wall thickening of the small bowel likely reflecting enterit
is, without obstruction or free air.



Signer Name: Timbo Tello MD 

Signed: 9/27/2020 12:21 AM

Workstation Name: [id number]